# Patient Record
Sex: FEMALE | Race: WHITE | Employment: FULL TIME | URBAN - METROPOLITAN AREA
[De-identification: names, ages, dates, MRNs, and addresses within clinical notes are randomized per-mention and may not be internally consistent; named-entity substitution may affect disease eponyms.]

---

## 2017-02-07 ENCOUNTER — OFFICE VISIT (OUTPATIENT)
Dept: ORTHOPEDIC SURGERY | Age: 27
End: 2017-02-07

## 2017-02-07 VITALS
WEIGHT: 150 LBS | SYSTOLIC BLOOD PRESSURE: 124 MMHG | DIASTOLIC BLOOD PRESSURE: 74 MMHG | HEART RATE: 70 BPM | BODY MASS INDEX: 25.75 KG/M2 | TEMPERATURE: 98 F

## 2017-02-07 DIAGNOSIS — S83.231A COMPLEX TEAR OF MEDIAL MENISCUS OF RIGHT KNEE AS CURRENT INJURY, INITIAL ENCOUNTER: Primary | ICD-10-CM

## 2017-02-07 NOTE — PATIENT INSTRUCTIONS
Meniscus Tear: Care Instructions  Your Care Instructions    The meniscus is rubbery tissue in the knee that acts as a shock absorber between the upper and lower leg bones. The meniscus also keeps your knee stable by spreading weight across it. Each knee has two menisci (plural of meniscus). You can tear a meniscus if you plant your foot and twist, or pivot. The meniscus also can wear down as you age, and it can tear from squatting or kneeling. Small tears may heal on their own with rest and some physical therapy. But a more serious tear may need surgery to repair it or to remove part of the meniscus. Your doctor may want you to see a doctor who specializes in bones and sports injuries. Follow-up care is a key part of your treatment and safety. Be sure to make and go to all appointments, and call your doctor if you are having problems. It's also a good idea to know your test results and keep a list of the medicines you take. How can you care for yourself at home? · Rest your knee when possible. · Do not squat or kneel. · Take pain medicines exactly as directed. ¨ If the doctor gave you a prescription medicine for pain, take it as prescribed. ¨ If you are not taking a prescription pain medicine, ask your doctor if you can take an over-the-counter medicine. · Put ice or a cold pack on your knee for 10 to 20 minutes at a time. Try to do this every 1 to 2 hours for the next 3 days (when you are awake) or until the swelling goes down. Put a thin cloth between the ice and your skin. · Prop up the sore leg on a pillow when you ice your knee or any time you sit or lie down during the next 3 days. Try to keep your leg above the level of your heart. This will help reduce swelling. · Follow your doctor's directions for using crutches or a knee brace, if suggested. · Follow your doctor's directions for exercises to keep your knee mobile and your leg muscles strong.  Here are a few exercises you can try if your doctor says it is okay. ¨ Quad sets: Lie down on the floor or the bed with your injured leg straight. Fully extend your leg--there should be no or little bend in your knee. Tighten the thigh (quadriceps) of your injured leg for 6 seconds. Do not lift your heel up. Relax your quadriceps for 10 seconds. Repeat this exercise 8 to 12 times several times during the day. ¨ Straight-leg raises: Lie down on the floor or the bed with your injured leg flat and your uninjured leg bent so that the bottom of your foot is on the floor or bed. Tighten the quadriceps of your injured leg. Keeping your knee as straight as possible, lift your injured leg off the bed until it is about 18 inches above the bed or floor. Lower your leg back down and relax for 5 seconds. Do 3 sets of 20 repetitions, or if you tire quickly, 3 sets of 8 to 12 repetitions. ¨ Heel raises: Stand with your feet a few inches apart. Rest your hands lightly on a counter or chair in front of you. Slowly raise your heels off the floor while keeping your knees straight. Hold for 3 seconds, then slowly lower your heels to the floor. Do 3 sets of 8 to 12 repetitions. ¨ Heel slides: Lie down on the floor or the bed with your leg flat. Slowly begin to slide your heel toward your rear end (buttocks), keeping your heel on the floor. Your knee will begin to bend. Slide your heel and bend your knee until it becomes a little sore and you can feel a small amount of pressure inside your knee. Hold this position for 10 seconds. Slide your heel back down until your leg is straight on the floor. Relax for 10 seconds. Repeat this exercise 20 times. When should you call for help? Watch closely for changes in your health, and be sure to contact your doctor if:  · You have increasing knee pain or swelling or both. · Your knee is so sore or stiff that you cannot walk on it. · You do not get better as expected. Where can you learn more?   Go to http://rosy-carl.info/. Enter W912 in the search box to learn more about \"Meniscus Tear: Care Instructions. \"  Current as of: May 23, 2016  Content Version: 11.1  © 2635-1071 [employer]. Care instructions adapted under license by [employer] (which disclaims liability or warranty for this information). If you have questions about a medical condition or this instruction, always ask your healthcare professional. [place] [address] any warranty or liability for your use of this information.
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
## 2017-02-14 ENCOUNTER — DOCUMENTATION ONLY (OUTPATIENT)
Dept: ORTHOPEDIC SURGERY | Age: 27
End: 2017-02-14

## 2017-02-14 NOTE — PROGRESS NOTES
Records request received from [facility] 2-14-17, faxed to [facility] at [facility]

## 2017-02-17 ENCOUNTER — DOCUMENTATION ONLY (OUTPATIENT)
Dept: ORTHOPEDIC SURGERY | Facility: CLINIC | Age: 27
End: 2017-02-17

## 2017-02-17 NOTE — PROGRESS NOTES
Mercedes Aguilar (atty) request for records was sent to [address] [place] [address] for processing w/billing stmt.

## 2017-03-07 ENCOUNTER — OFFICE VISIT (OUTPATIENT)
Dept: ORTHOPEDIC SURGERY | Age: 27
End: 2017-03-07

## 2017-03-07 VITALS
SYSTOLIC BLOOD PRESSURE: 117 MMHG | HEIGHT: 64 IN | WEIGHT: 150 LBS | TEMPERATURE: 97.4 F | DIASTOLIC BLOOD PRESSURE: 74 MMHG | BODY MASS INDEX: 25.61 KG/M2 | HEART RATE: 75 BPM

## 2017-03-07 DIAGNOSIS — S83.231D COMPLEX TEAR OF MEDIAL MENISCUS OF RIGHT KNEE AS CURRENT INJURY, SUBSEQUENT ENCOUNTER: Primary | ICD-10-CM

## 2017-03-07 RX ORDER — TRAMADOL HYDROCHLORIDE 50 MG/1
50 TABLET ORAL
Qty: 40 TAB | Refills: 0 | Status: SHIPPED | OUTPATIENT
Start: 2017-03-07

## 2017-03-07 NOTE — PROGRESS NOTES
HISTORY AND PHYSICAL          Patient: Raleigh Odonnell                MRN: [id number]       SSN: [id number]  YOB: 1990          AGE: 31 y.o. SEX: female      Patient scheduled for:  Right knee arthroscopic partial medial menisectomy    Surgeon: Ashlie Chowdhury MD    ANESTHESIA TYPE:  General    HISTORY:     The patient was seen in the office today for a preoperative history and physical for an upcoming above listed surgery. The patient is a pleasant 31 y.o. female who has a history of right knee pain. Pain worse with better with rest.        Due to the current findings, affected activity of daily living and continued pain and discomfort, surgical intervention is indicated. The alternatives, risks, and complications, including but not limited to infection, blood loss, need for blood transfusion, neurovascular damage, leighton-incisional numbness, subcutaneous hematoma, bone fracture, anesthetic complications, DVT, PE, death, RSD, postoperative stiffness and pain, possible surgical scar, delayed healing and nonhealing, reflexive sympathetic dystrophy, damage to blood vessels and nerves, need for more surgery, MI, and stroke,  failure of hardware, gait disturbances,have been discussed. The patient understands and wishes to proceed with surgery. PAST MEDICAL HISTORY:     Past Medical History:   Diagnosis Date    Anemia     Arthritis     Jaw dislocation     Seizures ([facility] [address]     stress triggered       CURRENT MEDICATIONS:     Current Outpatient Prescriptions   Medication Sig Dispense Refill    traMADol (ULTRAM) 50 mg tablet Take 1 Tab by mouth every six (6) hours as needed for Pain. Max Daily Amount: 200 mg. Do not take until after surgery 40 Tab 0    norethindrone ac-eth estradiol (David Capone 1.5/30, 21,) 1.5-30 mg-mcg tab Take  by mouth.  ibuprofen (MOTRIN) 400 mg tablet Take  by mouth every six (6) hours as needed for Pain.       diclofenac EC (VOLTAREN) 50 mg EC tablet Take  by mouth.      cyclobenzaprine (FLEXERIL) 5 mg tablet Take 5 mg by mouth.  OTHER Nutrition supplement instant powder mix Moringa Oleifera Blend      clonazePAM (KLONOPIN) 1 mg tablet Take 1 Tab by mouth nightly. Max Daily Amount: 1 mg. Start off with 1/2 tabs orally daily 30 Tab 1    topiramate (TOPAMAX) 25 mg tablet Take 1 tablet by mouth two (2) times daily (with meals). 60 tablet 6    folic acid (FOLVITE) 1 mg tablet Take 1 tablet by mouth daily. 30 tablet 5    ergocalciferol (VITAMIN D2) 50,000 unit capsule Take 50,000 Units by mouth. 1 every 7 days      prenatal multivit-ca-min-fe-fa tab Take 1 Tab by mouth.  triamcinolone (NASACORT AQ) 55 mcg nasal inhaler 2 Sprays by Both Nostrils route daily. OK to substitute with generic 1 Bottle 3    promethazine-codeine (PHENERGAN WITH CODEINE) 6.25-10 mg/5 mL syrup Take 5 mL by mouth four (4) times daily as needed for Cough (Dispense 120 mL bottle). OK to substitute with generic 1 Bottle 0    Levocetirizine (XYZAL) 5 mg tablet Take 5 mg by mouth daily.  OK to substitute with generic 30 Tab 3       ALLERGIES:     Allergies   Allergen Reactions    Amoxicillin Rash    Percocet [Oxycodone-Acetaminophen] Rash    Vicodin [Hydrocodone-Acetaminophen] Rash         SURGICAL HISTORY:     Past Surgical History:   Procedure Laterality Date    HX GYN      c section    HX HEENT      abcess removed from throat       SOCIAL HISTORY:     Social History     Social History    Marital status: LEGALLY      Spouse name: N/A    Number of children: N/A    Years of education: N/A     Social History Main Topics    Smoking status: Never Smoker    Smokeless tobacco: Never Used    Alcohol use Yes      Comment: occasionally    Drug use: No    Sexual activity: No     Other Topics Concern    Not on file     Social History Narrative       FAMILY HISTORY:     Family History   Problem Relation Age of Onset    Hypertension Mother     Depression Mother     Diabetes Father     Arthritis-osteo Father        REVIEW OF SYSTEMS:     Negative for fevers, chills, chest pain, shortness of breath, weight loss, recent illness     General: Negative for fever and chills. No unexpected change in weight. Denies fatigue. No change in appetite. Skin: Negative for rash or itching. HEENT: Negative for congestion, sore throat, neck pain and neck stiffness. No change in vision or hearing. Hasn't noted any enlarged lymph nodes in the neck. Cardiovascular:  Negative for chest pain and palpitations. Has not noted pedal edema. Respiratory: Negative for cough, colds, sinus, hemoptysis, shortness of breath and wheezing. Gastrointestinal: Negative for nausea and vomiting, rectal bleeding, coffee ground emesis, abdominal pain, diarrhea and constipation. Genitourinary: Negative for dysuria, frequency urgency, or burning on micturition. No flank pain, no foul smelling urine, no difficulty with initiating urination. Hematological: Negative for bleeding or easy bruising. Musculoskeletal: Negative  for arthralgias, back pain or neck pain. Neurological: Negative for dizziness, seizures or syncopal episodes. Denies headaches. Endocrine: Denies excessive thirst.  No heat/cold intolerance. Psychiatric: Negative for depression or insomnia. PHYSICAL EXAMINATION:     VITALS:   Visit Vitals    /74    Pulse 75    Temp 97.4 °F (36.3 °C) (Oral)    Ht 5' 4\" (1.626 m)    Wt 150 lb (68 kg)    BMI 25.75 kg/m2     GEN:  Well developed, well nourished 31 y.o. female in no acute distress. HEENT: Normocephalic and atraumatic. Eyes: Conjunctivae and EOM are normal.Pupils are equal, round, and reactive to light. External ear normal appearance, external nose normal appearing. Mouth/Throat: Oropharynx is clear and moist, able to handle oral secretions w/out difficulty, airway patent  NECK: Supple. Normal ROM, No lymphadenopathy. Trachea is midline.  No bruising, swelling or deformity  RESP: Clear to auscultation bilaterally. No wheezes, rales, rhonchi. Normal effort and breath sounds. No respiratory distress  CARDIO:  Normal rate, regular rhythm and normal heart sounds. No MGR. ABDOMEN: Soft, non-tender, non-distended, normoactive bowel sounds in all four quadrants. There is no tenderness. There is no rebound and no guarding. BACK: No CVA or spinal tenderness  BREAST:  Deferred  PELVIC:    Deferred   RECTAL:  Deferred   :           Deferred  EXTREMITIES: EXAMINATION OF: right knee  Examination Right knee   Skin Intact   Range of motion 0-120   Effusion +   Medial joint line tenderness +   Lateral joint line tenderness -   Tenderness Pes Bursa -   Tenderness insertion MCL -   Tenderness insertion LCL -   Radhas +   Patella crepitus -   Patella grind -   Lachman -   Pivot shift -   Anterior drawer -   Posterior drawer -   Varus stress -   Valgus stress -   Neurovascular Intact   Calf Swelling and Tenderness to Palpation -   Milad's Test -   Hamstring Cord Tightness -     NEUROVASCULAR: Sensation intact to light touch and strength grossly intact and symmetrical. No nystagmus. Positive distal pulses and capillary refill. DVT ASSESSMENT:  There is not  calf tenderness. No evidence of DVT seen on physical exam.  MOTOR: In tact  PSYCH: Alert an oriented to person, place and time. Mood, memory, affect, behavior and judgment normal       RADIOGRAPHS & DIAGNOSTIC STUDIES:     MRI right knee/xray reveals : 1. Complex tear of the medial meniscus posterior horn through body segment,  predominantly horizontal oblique component.     -Ligaments intact.     2. A grade 3 oblique fissure in the patellar cartilage as above,  delamination-type tear with potential for flap formation. LABS:       No labs needed      ASSESSMENT:       Encounter Diagnosis   Name Primary?     Complex tear of medial meniscus of right knee as current injury, subsequent encounter Yes       PLAN:     Again, the alternatives, risks, and complications, as well as expected outcome were discussed. The patient understands and agrees to proceed with right knee arthroscopic partial medial menisectomy.  Patient given orders listed below:    Orders Placed This Encounter    traMADol (ULTRAM) 50 mg tablet         Demetris Mclaughlin PA-C  3/7/2017  12:49 PM

## 2017-03-13 ENCOUNTER — TELEPHONE (OUTPATIENT)
Dept: ORTHOPEDIC SURGERY | Age: 27
End: 2017-03-13

## 2017-03-13 NOTE — TELEPHONE ENCOUNTER
KATYA FROM  [facility]UR[facility] [place], STATES THAT HE IS NEEDING ORDERS PUT IN FOR THE PATIENT, STATES THAT HE IS NEEDING OUT PATIENT ORDERS FOR THE PATIENT, PLEASE CALL KATYA [phone] IF THERE ARE ANY QUESTIONS.

## 2017-03-16 ENCOUNTER — TELEPHONE (OUTPATIENT)
Dept: ORTHOPEDIC SURGERY | Age: 27
End: 2017-03-16

## 2017-03-16 NOTE — TELEPHONE ENCOUNTER
Pt calling in states that she is still running a fever, has been since she came home from surgery. She has little swelling, pain is radiating from her knee to her back, she would like to know if she can take Motrin or Ibprofin.

## 2017-03-16 NOTE — TELEPHONE ENCOUNTER
Spoke with patient and instructed her to go to the ED if temp reaches 100.4 or greater. As of now, her temp is 99. She will take Motrin 2 tabs every 4 hours as needed for fever.  She will call if symptoms worsen

## 2017-03-23 ENCOUNTER — OFFICE VISIT (OUTPATIENT)
Dept: ORTHOPEDIC SURGERY | Age: 27
End: 2017-03-23

## 2017-03-23 VITALS
WEIGHT: 148.6 LBS | HEIGHT: 64 IN | SYSTOLIC BLOOD PRESSURE: 114 MMHG | TEMPERATURE: 97.6 F | BODY MASS INDEX: 25.37 KG/M2 | HEART RATE: 77 BPM | DIASTOLIC BLOOD PRESSURE: 83 MMHG

## 2017-03-23 DIAGNOSIS — S83.231D COMPLEX TEAR OF MEDIAL MENISCUS OF RIGHT KNEE AS CURRENT INJURY, SUBSEQUENT ENCOUNTER: Primary | ICD-10-CM

## 2017-03-23 DIAGNOSIS — M79.89 RIGHT LEG SWELLING: ICD-10-CM

## 2017-03-23 NOTE — PROGRESS NOTES
Patient: Raleigh Odonnell  YOB: 1990       HISTORY:  The patient presents for reevaluation of her right knee status post arthroscopic partial medial menisectomy on 3/13/17. Patient is improved, states pain is a 6 out of 10.  she has not gone to physical therapy. She has been taking care of her child and has been doing quite a bit. Also notes a low grade fever since surgery. Tmax 99. Denies any drainage or redness from incisions. Patient denies any fever, chills, chest pain, shortness of breath or calf pain. There are no new illness or injuries to report since last seen in the office. PHYSICAL EXAMINATION:    Visit Vitals    /83    Pulse 77    Temp 97.6 °F (36.4 °C) (Oral)    Ht 5' 4\" (1.626 m)    Wt 148 lb 9.6 oz (67.4 kg)    BMI 25.51 kg/m2     The patient is a well-developed, well-nourished female in no acute distress. The patient is alert and oriented times three. The patient appears to be well groomed. Mood and affect are normal.   ORTHOPEDIC EXAM of Right knee: Inspection: Effusion not present,  incisions clean, dry intact, sutures in place  TTP: medial joint line  Range of motion: 0-110 flexion  Stability: Stable  Strength: 5/5  2+ distal pulses    IMPRESSION:  Status post Right knee arthroscopic partial medial menisectomy. PLAN: No signs of active infection. Incisions cleaned and Steri-Strips were applied. Patient is weight bearing as tolerated. OK to d/c use of crutches/walker. Will set up with physical therapy. Will order STAT DVT ultrasound r/o DVT. Patient does not request refill of pain medication. Father in room today as well. Patient will follow up in 3 weeks.     Jose Christopher PA-C  [facility]

## 2017-03-25 ENCOUNTER — DOCUMENTATION ONLY (OUTPATIENT)
Dept: ORTHOPEDIC SURGERY | Age: 27
End: 2017-03-25

## 2017-03-26 NOTE — PROGRESS NOTES
Received call from patient via answering service. Patient is 12 days s/p right knee arthroscopic partial medial menisectomy on 3/13/17. Patient states that she is experiencing cramps in foot and that she has increased swelling in her right ankle and foot and that there is an enlarged vein in her foot and warm compresses has made it worse. Area is tender to the touch. Patient was advised to go to the ED. Patient states that her mom just left and she has no one to take her to the ED and she cannot drive. She states that she would like to try to make it through the weekend and then she may have a ride to ED or office on Monday. Advised patient that if the warm compresses are not helping, then discontinue. She can try ice (protecting skin). Once again advised patient that she should present to the ED for evaluation and follow up with the office on Monday.      Marsha Paris PA-C  3/25/2017   8:14 PM

## 2017-03-27 ENCOUNTER — DOCUMENTATION ONLY (OUTPATIENT)
Dept: ORTHOPEDIC SURGERY | Age: 27
End: 2017-03-27

## 2017-03-27 NOTE — PROGRESS NOTES
Alvin Victoria spoke with patient advising importance of going to ER for non invasive scan. Patient had been told this prior, and not followed medical advise. Once again she stressed importance of scan.

## 2017-03-27 NOTE — PROGRESS NOTES
Looks like patient did not go to ER as instructed last week. She needs to get DVT doppler done on leg. Must go to ER.

## 2017-05-16 ENCOUNTER — OFFICE VISIT (OUTPATIENT)
Dept: ORTHOPEDIC SURGERY | Age: 27
End: 2017-05-16

## 2017-05-16 VITALS
SYSTOLIC BLOOD PRESSURE: 117 MMHG | BODY MASS INDEX: 26.05 KG/M2 | HEART RATE: 88 BPM | TEMPERATURE: 98.2 F | DIASTOLIC BLOOD PRESSURE: 78 MMHG | WEIGHT: 152.6 LBS | HEIGHT: 64 IN

## 2017-05-16 DIAGNOSIS — S83.231D COMPLEX TEAR OF MEDIAL MENISCUS OF RIGHT KNEE AS CURRENT INJURY, SUBSEQUENT ENCOUNTER: Primary | ICD-10-CM

## 2017-05-16 NOTE — PROGRESS NOTES
Patient: Mirela Morris  YOB: 1990       HISTORY:  The patient presents for reevaluation of her right knee status post arthroscopic partial medial menisectomy on 3/13/17. Patient is improved, states pain is a 0 out of 10.  she has been going to physical therapy. She states that therapy would like to work with her x 2 more weeks. Still on no duty status at work Patient denies any fever, chills, chest pain, shortness of breath or calf pain. There are no new illness or injuries to report since last seen in the office. PHYSICAL EXAMINATION:    Visit Vitals    /78    Pulse 88    Temp 98.2 °F (36.8 °C) (Oral)    Ht 5' 4\" (1.626 m)    Wt 152 lb 9.6 oz (69.2 kg)    BMI 26.19 kg/m2     The patient is a well-developed, well-nourished female in no acute distress. The patient is alert and oriented times three. The patient appears to be well groomed. Mood and affect are normal.   ORTHOPEDIC EXAM of Right knee: Inspection: Effusion not present,  incisions well healed  TTP: medial joint line  Range of motion: 0-130 flexion  Stability: Stable  Strength: 5/5  2+ distal pulses    IMPRESSION:  Status post Right knee arthroscopic partial medial menisectomy. PLAN:   1. Patient much improved today  2.  Will cont with PT until end of month  3. Return to work full duty in 2 weeks  RTC 4 weeks if pain persists      Ana Dowling PA-C  [facility] [address] [facility]

## 2017-05-16 NOTE — LETTER
NOTIFICATION RETURN TO WORK / SCHOOL 
 
5/16/2017 11:31 AM 
 
Ms. Ivanna Erickson [address] [place] [address] To Whom It May Concern: 
 
Ivanna Erickson is currently under the care of [address] [place] [address] Yariel Chase. She will return to work at full duty status starting May 30, 2017. If there are questions or concerns please have the patient contact our office. Sincerely, NESHA Plasencia

## 2017-07-11 ENCOUNTER — DOCUMENTATION ONLY (OUTPATIENT)
Dept: ORTHOPEDIC SURGERY | Age: 27
End: 2017-07-11

## 2017-07-11 NOTE — PROGRESS NOTES
Records request received from [facility] Lauri OVIEDO 7-11-17, faxed to [facility] at [facility]

## 2017-07-24 ENCOUNTER — OFFICE VISIT (OUTPATIENT)
Dept: ORTHOPEDIC SURGERY | Age: 27
End: 2017-07-24

## 2017-07-24 VITALS
DIASTOLIC BLOOD PRESSURE: 78 MMHG | SYSTOLIC BLOOD PRESSURE: 117 MMHG | BODY MASS INDEX: 26.29 KG/M2 | HEIGHT: 64 IN | WEIGHT: 154 LBS | OXYGEN SATURATION: 99 % | RESPIRATION RATE: 18 BRPM | TEMPERATURE: 97.8 F | HEART RATE: 77 BPM

## 2017-07-24 DIAGNOSIS — S83.231D COMPLEX TEAR OF MEDIAL MENISCUS OF RIGHT KNEE AS CURRENT INJURY, SUBSEQUENT ENCOUNTER: Primary | ICD-10-CM

## 2017-07-24 RX ORDER — ETODOLAC 400 MG/1
400 TABLET, FILM COATED ORAL 2 TIMES DAILY WITH MEALS
Qty: 60 TAB | Refills: 1 | Status: SHIPPED | OUTPATIENT
Start: 2017-07-24

## 2017-07-24 NOTE — PROGRESS NOTES
Veleta Hamman  1990   Chief Complaint   Patient presents with    Knee Pain     right        HISTORY OF PRESENT ILLNESS  Veleta Hamman is a 28 y.o. female who presents today for reevaluation of right knee. She states she has been doing pretty well until last week. 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 She now has sharp pains. Feels like her muscle spasms are back. She is having night pain. She is limping at the end of the day. Feels like it is swollen. Patient denies any fever, chills, chest pain, shortness of breath or calf pain. There are no new illness or injuries to report since last seen in the office. No changes in medications, allergies, social or family history. PHYSICAL EXAM:   Visit Vitals    /78    Pulse 77    Temp 97.8 °F (36.6 °C) (Oral)    Resp 18    Ht 5' 4\" (1.626 m)    Wt 154 lb (69.9 kg)    LMP 07/23/2017 (Exact Date)    SpO2 99%    BMI 26.43 kg/m2     The patient is a well-developed, well-nourished female   in no acute distress. The patient is alert and oriented times three. The patient is alert and oriented times three. Mood and affect are normal.  LYMPHATIC: lymph nodes are not enlarged and are within normal limits  SKIN: normal in color and non tender to palpation. There are no bruises or abrasions noted. NEUROLOGICAL: Motor sensory exam is within normal limits. Reflexes are equal bilaterally.  There is normal sensation to pinprick and light touch  MUSCULOSKELETAL:  Examination Right knee   Skin Intact   Range of motion 0-130   Effusion + small effusion   Medial joint line tenderness +   Lateral joint line tenderness -   Tenderness Pes Bursa -   Tenderness insertion MCL -   Tenderness insertion LCL -   Radhas -   Patella crepitus -   Patella grind -   Lachman -   Pivot shift -   Anterior drawer -   Posterior drawer -   Varus stress -   Valgus stress -   Neurovascular Intact   Calf Swelling and Tenderness to Palpation -   Milad's Test -   Hamstring Cord Tightness -       IMPRESSION:      ICD-10-CM ICD-9-CM    1. Complex tear of medial meniscus of right knee as current injury, subsequent encounter S83.231D 836.0         PLAN:   1. Patient with acute pain after injury last week  2. No cortisone injection indicated today   3. Yes Physical/Occupational Therapy indicated today will cont with her home exercises as long as no increases in pain  4. Yes diagnostic test indicated today: MRI right knee r/o MMT  5. No durable medical equipment indicated today  6. No referral indicated today   7.  Yes medications indicated today: lodine 400mg BID for inflammation    RTC after MRI with Dr. Radha Diaz   Follow-up Disposition: Not on [facility], [facility]

## 2017-08-01 ENCOUNTER — HOSPITAL ENCOUNTER (OUTPATIENT)
Age: 27
Discharge: HOME | End: 2017-08-01
Attending: PHYSICIAN ASSISTANT
Payer: COMMERCIAL

## 2017-08-01 DIAGNOSIS — S83.231D COMPLEX TEAR OF MEDIAL MENISCUS OF RIGHT KNEE AS CURRENT INJURY, SUBSEQUENT ENCOUNTER: ICD-10-CM

## 2017-08-01 PROCEDURE — 73721 MRI JNT OF LWR EXTRE W/O DYE: CPT

## 2017-08-03 ENCOUNTER — OFFICE VISIT (OUTPATIENT)
Dept: ORTHOPEDIC SURGERY | Age: 27
End: 2017-08-03

## 2017-08-03 VITALS
OXYGEN SATURATION: 100 % | RESPIRATION RATE: 18 BRPM | SYSTOLIC BLOOD PRESSURE: 117 MMHG | HEIGHT: 64 IN | BODY MASS INDEX: 26.7 KG/M2 | WEIGHT: 156.4 LBS | TEMPERATURE: 97.6 F | DIASTOLIC BLOOD PRESSURE: 82 MMHG | HEART RATE: 60 BPM

## 2017-08-03 DIAGNOSIS — S83.91XD SPRAIN OF RIGHT KNEE, UNSPECIFIED LIGAMENT, SUBSEQUENT ENCOUNTER: Primary | ICD-10-CM

## 2017-08-03 RX ORDER — MELOXICAM 15 MG/1
15 TABLET ORAL
Qty: 30 TAB | Refills: 1 | Status: SHIPPED | OUTPATIENT
Start: 2017-08-03

## 2017-08-03 NOTE — PROGRESS NOTES
Pete Liao  1990   Chief Complaint   Patient presents with    Knee Pain     right        HISTORY OF PRESENT ILLNESS  Pete Liao is a 28 y.o. female who presents today for reevaluation of right knee and to review her MRI results. She rates her pain 4/10 today. She now has sharp pains. Feels like her muscle spasms are back. She is having night pain. She is limping at the end of the day. Feels like it is swollen. Patient denies any fever, chills, chest pain, shortness of breath or calf pain. There are no new illness or injuries to report since last seen in the office. No changes in medications, allergies, social or family history. PHYSICAL EXAM:   Visit Vitals    /82    Pulse 60    Temp 97.6 °F (36.4 °C) (Oral)    Resp 18    Ht 5' 4\" (1.626 m)    Wt 156 lb 6.4 oz (70.9 kg)    LMP 07/23/2017 (Exact Date)    SpO2 100%    BMI 26.85 kg/m2     The patient is a well-developed, well-nourished female   in no acute distress. The patient is alert and oriented times three. The patient is alert and oriented times three. Mood and affect are normal.  LYMPHATIC: lymph nodes are not enlarged and are within normal limits  SKIN: normal in color and non tender to palpation. There are no bruises or abrasions noted. NEUROLOGICAL: Motor sensory exam is within normal limits. Reflexes are equal bilaterally.  There is normal sensation to pinprick and light touch  MUSCULOSKELETAL:  Examination Right knee   Skin Intact   Range of motion 0-130   Effusion -   Medial joint line tenderness +   Lateral joint line tenderness -   Tenderness Pes Bursa -   Tenderness insertion MCL -   Tenderness insertion LCL -   Radhas -   Patella crepitus -   Patella grind -   Lachman -   Pivot shift -   Anterior drawer -   Posterior drawer -   Varus stress -   Valgus stress -   Neurovascular Intact   Calf Swelling and Tenderness to Palpation -   Milad's Test -   Hamstring Cord Tightness -     IMAGING:   MRI of the right knee dated 8/1/17 was reviewed and read:   IMPRESSION:  1. Abnormal obliquely oriented linear signal in the posterior horn of the medial  meniscus. New meniscal tear versus postoperative appearance. Similar small  potential meniscal cyst at the posterior meniscal root. MR arthrogram may be  indicated for further evaluation of potential new tear versus postoperative  appearance, if indicated. 2. 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 No  full-thickness defect or subchondral edema. Retinacula are intact. Extensor  mechanism is intact. IMPRESSION:      ICD-10-CM ICD-9-CM    1. Sprain of right knee, unspecified ligament, subsequent encounter S83. 91XD 844.9         PLAN:   1. I discussed the results of the MRI and the treatment options with the patient. 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 Risk factors include: joshua  2. No cortisone injection indicated today   3. No Physical/Occupational Therapy indicated today  4. No diagnostic test indicated today  5. No durable medical equipment indicated today  6. No referral indicated today   7. No medications indicated today  8. No Narcotic indicated today. RTC 4 weeks if pain continues    Follow-up Disposition: Not on File      Scribed by Marietta Garcia [facility]) as dictated by MD JOHANNE Salter, Dr. Deloris Dominguez, confirm that all documentation is accurate.     Deloris Dominguez M.D.   [facility]

## 2017-08-07 ENCOUNTER — TELEPHONE (OUTPATIENT)
Dept: ORTHOPEDIC SURGERY | Age: 27
End: 2017-08-07

## 2017-08-07 NOTE — TELEPHONE ENCOUNTER
Patient called in states that she needs an itemized billing statement and office notes faxed to Sindhu Faye at [phone]. Patient can be reached at [phone].

## 2017-08-17 ENCOUNTER — TELEPHONE (OUTPATIENT)
Dept: ORTHOPEDIC SURGERY | Age: 27
End: 2017-08-17

## 2017-08-17 NOTE — TELEPHONE ENCOUNTER
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 She is wondering if there is any other prescription she can take besides  etodolac (LODINE) 400 mg tablet that she can take. She is also requesting her progress notes from her last office visit and says she can come in today to pick them up. Please advise patient at [phone].

## 2017-08-17 NOTE — TELEPHONE ENCOUNTER
She does not need to take anything anymore. She can take motrin over the counter.  Help her have access to whatever notes she needs

## 2017-09-01 ENCOUNTER — DOCUMENTATION ONLY (OUTPATIENT)
Dept: ORTHOPEDIC SURGERY | Age: 27
End: 2017-09-01

## 2017-09-01 NOTE — PROGRESS NOTES
Records request received from [facility] Lauri OVIEDO 9-1-17, faxed to [facility] at [facility]

## 2022-01-01 NOTE — TELEPHONE ENCOUNTER
Call patient with info. She will  her office note at the [facility] office. (1) body pink, extremities blue